# Patient Record
Sex: FEMALE | Race: BLACK OR AFRICAN AMERICAN | Employment: FULL TIME | URBAN - METROPOLITAN AREA
[De-identification: names, ages, dates, MRNs, and addresses within clinical notes are randomized per-mention and may not be internally consistent; named-entity substitution may affect disease eponyms.]

---

## 2018-10-16 ENCOUNTER — OFFICE VISIT (OUTPATIENT)
Dept: OBGYN CLINIC | Facility: CLINIC | Age: 53
End: 2018-10-16
Payer: COMMERCIAL

## 2018-10-16 VITALS
WEIGHT: 170 LBS | SYSTOLIC BLOOD PRESSURE: 126 MMHG | HEIGHT: 65.5 IN | BODY MASS INDEX: 27.98 KG/M2 | DIASTOLIC BLOOD PRESSURE: 90 MMHG

## 2018-10-16 DIAGNOSIS — N95.0 POSTMENOPAUSAL BLEEDING: Primary | ICD-10-CM

## 2018-10-16 PROCEDURE — 99213 OFFICE O/P EST LOW 20 MIN: CPT | Performed by: OBSTETRICS & GYNECOLOGY

## 2018-10-16 RX ORDER — LEVOTHYROXINE SODIUM 0.1 MG/1
TABLET ORAL
Refills: 2 | COMMUNITY
Start: 2018-10-12

## 2018-10-16 RX ORDER — ERGOCALCIFEROL 1.25 MG/1
CAPSULE ORAL
Refills: 0 | COMMUNITY
Start: 2018-10-12

## 2018-10-16 NOTE — PROGRESS NOTES
GYN H&P     10/16/2018  4:27 PM    CC: Patient is here for postmenopausal bleeding. Pt of Dr. Christiano Cintron. Previous pt of mine, not seen for 2 years.     HPI: Patient is a 47year old  for vaginal spotting which comes and goes, occurring every 4 - 5 M mariam History      Occupation:     Tobacco Use      Smoking status: Never Smoker      Smokeless tobacco: Never Used    Substance and Sexual Activity      Alcohol use: No        Frequency: Never      Drug use: No      Sexual activity: Yes are with a normal range (double layer thickness = 9.4 mm)    A/P: Patient is 47year old female     1. Postmenopausal bleeding    Likely perimenopausal bleeding. Plan to check USN to evaluate EM stripe.   Elba Gifford MD

## 2018-10-25 ENCOUNTER — ULTRASOUND ENCOUNTER (OUTPATIENT)
Dept: OBGYN CLINIC | Facility: CLINIC | Age: 53
End: 2018-10-25
Payer: COMMERCIAL

## 2018-10-25 DIAGNOSIS — N95.0 POSTMENOPAUSAL BLEEDING: ICD-10-CM

## 2018-10-25 PROCEDURE — 76830 TRANSVAGINAL US NON-OB: CPT | Performed by: OBSTETRICS & GYNECOLOGY

## 2018-10-30 ENCOUNTER — TELEPHONE (OUTPATIENT)
Dept: OBGYN CLINIC | Facility: CLINIC | Age: 53
End: 2018-10-30

## 2018-10-30 NOTE — TELEPHONE ENCOUNTER
----- Message from Erika Kenny RN sent at 10/30/2018 11:19 AM CDT -----  Dr. Sloane Boswell,   I gave pt results and offered to schedule her for an appt if she other concerns and she would like to speak to you.     Laim Granda    ----- Message -----  From:

## 2018-10-30 NOTE — TELEPHONE ENCOUNTER
Called pt back and dw pt normal usn. She is still having a small amt of bleeding after sex. Recommend KY jelly.  DW her pap and USN are both normal.

## 2019-11-27 ENCOUNTER — OFFICE VISIT (OUTPATIENT)
Dept: FAMILY MEDICINE CLINIC | Facility: CLINIC | Age: 54
End: 2019-11-27
Payer: COMMERCIAL

## 2019-11-27 VITALS
DIASTOLIC BLOOD PRESSURE: 76 MMHG | HEART RATE: 44 BPM | SYSTOLIC BLOOD PRESSURE: 144 MMHG | BODY MASS INDEX: 26.72 KG/M2 | WEIGHT: 160.38 LBS | TEMPERATURE: 97 F | RESPIRATION RATE: 16 BRPM | HEIGHT: 65 IN | OXYGEN SATURATION: 97 %

## 2019-11-27 DIAGNOSIS — M79.661 PAIN OF RIGHT CALF: Primary | ICD-10-CM

## 2019-11-27 PROBLEM — M72.2 PLANTAR FASCIITIS, BILATERAL: Status: ACTIVE | Noted: 2019-11-27

## 2019-11-27 PROCEDURE — 99203 OFFICE O/P NEW LOW 30 MIN: CPT | Performed by: NURSE PRACTITIONER

## 2019-11-27 RX ORDER — NAPROXEN 500 MG/1
500 TABLET ORAL 2 TIMES DAILY WITH MEALS
Qty: 30 TABLET | Refills: 0 | Status: SHIPPED | OUTPATIENT
Start: 2019-11-27 | End: 2019-12-12

## 2019-11-27 NOTE — PROGRESS NOTES
HPI:    Patient ID: Sumit Kevin Adjutant is a 49year old female. Patient presents to walk in clinic with right calf pain.  She was exercising yesterday in an intense step class and stepped backward off the step; she immediately felt a sharp pain and pressu Comments: Upon exam patient with mild limp due to right calf pain. Distal pulses equal bilaterally with capillary refill to toes less than 3 seconds. Equal popliteal pulses bilaterally.  Mild swelling to right calf of 0.25 inches (may be normal deviation · Keep the hurt area raised above heart level to reduce pain and swelling. This is especially important during the first 48 hours. · Apply an ice pack over the injured area for 15 to 20 minutes every 3 to 6 hours.  You should do this for the first 24 to 48

## 2019-11-27 NOTE — PATIENT INSTRUCTIONS
· Please start Naproxyn twice daily with meals to help with swelling/pain. Take prevacid or Prilosec once daily during use to protect stomach. · Use ice 20 minutes on/off with covered ice bag to area to help with pain.   · Keep leg elevated as often as pos injured leg without pain. Follow-up care  Follow up with your healthcare provider, or as advised.   When to seek medical advice  Call your healthcare provider right away if any of these occur:  · The toes of the injured leg become swollen, cold, blue, numb

## 2021-07-13 ENCOUNTER — WALK IN (OUTPATIENT)
Dept: URGENT CARE | Age: 56
End: 2021-07-13

## 2021-07-13 VITALS
HEART RATE: 72 BPM | SYSTOLIC BLOOD PRESSURE: 128 MMHG | DIASTOLIC BLOOD PRESSURE: 80 MMHG | RESPIRATION RATE: 16 BRPM | TEMPERATURE: 98.2 F
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
## 2021-07-13 DIAGNOSIS — H10.9 CONJUNCTIVITIS OF LEFT EYE, UNSPECIFIED CONJUNCTIVITIS TYPE: ICD-10-CM

## 2021-07-13 PROCEDURE — 99213 OFFICE O/P EST LOW 20 MIN: CPT | Performed by: NURSE PRACTITIONER

## 2021-07-13 RX ORDER — METHYLPREDNISOLONE 4 MG/1
4 TABLET ORAL SEE ADMIN INSTRUCTIONS
Qty: 21 TABLET | Refills: 0 | Status: SHIPPED | OUTPATIENT
Start: 2021-07-13

## 2021-07-13 RX ORDER — LEVOTHYROXINE SODIUM 0.1 MG/1
TABLET ORAL
COMMUNITY
Start: 2018-10-12

## 2021-07-13 RX ORDER — ERYTHROMYCIN 5 MG/G
OINTMENT OPHTHALMIC
Qty: 3.5 G | Refills: 0 | Status: SHIPPED | OUTPATIENT
Start: 2021-07-13

## 2021-07-13 ASSESSMENT — VISUAL ACUITY: OU: 1

## 2021-07-13 ASSESSMENT — ENCOUNTER SYMPTOMS
EYE ITCHING: 1
EYE REDNESS: 1
FEVER: 0
CHILLS: 0
PHOTOPHOBIA: 1
FATIGUE: 0

## 2022-01-11 ENCOUNTER — LAB ENCOUNTER (OUTPATIENT)
Dept: LAB | Age: 57
End: 2022-01-11
Attending: NURSE PRACTITIONER
Payer: COMMERCIAL

## 2022-01-11 ENCOUNTER — ORDER TRANSCRIPTION (OUTPATIENT)
Dept: ADMINISTRATIVE | Facility: HOSPITAL | Age: 57
End: 2022-01-11

## 2022-01-11 DIAGNOSIS — U07.1 INFECTION DUE TO 2019-NCOV: ICD-10-CM

## 2022-01-11 DIAGNOSIS — U07.1 INFECTION DUE TO 2019-NCOV: Primary | ICD-10-CM

## 2022-01-13 LAB — SARS-COV-2 RNA RESP QL NAA+PROBE: DETECTED

## 2023-01-02 ENCOUNTER — HOSPITAL ENCOUNTER (EMERGENCY)
Facility: HOSPITAL | Age: 58
Discharge: HOME | End: 2023-01-02
Payer: COMMERCIAL

## 2023-01-02 ENCOUNTER — APPOINTMENT (OUTPATIENT)
Dept: GENERAL RADIOLOGY | Facility: HOSPITAL | Age: 58
End: 2023-01-02
Attending: NURSE PRACTITIONER
Payer: COMMERCIAL

## 2023-01-02 VITALS
HEIGHT: 66 IN | TEMPERATURE: 98 F | OXYGEN SATURATION: 98 % | RESPIRATION RATE: 18 BRPM | HEART RATE: 45 BPM | WEIGHT: 160 LBS | SYSTOLIC BLOOD PRESSURE: 138 MMHG | BODY MASS INDEX: 25.71 KG/M2 | DIASTOLIC BLOOD PRESSURE: 87 MMHG

## 2023-01-02 DIAGNOSIS — S60.222A CONTUSION OF LEFT HAND, INITIAL ENCOUNTER: ICD-10-CM

## 2023-01-02 DIAGNOSIS — S76.011A STRAIN OF RIGHT HIP, INITIAL ENCOUNTER: Primary | ICD-10-CM

## 2023-01-02 PROCEDURE — 73502 X-RAY EXAM HIP UNI 2-3 VIEWS: CPT | Performed by: NURSE PRACTITIONER

## 2023-01-02 PROCEDURE — 99284 EMERGENCY DEPT VISIT MOD MDM: CPT

## 2023-01-02 PROCEDURE — 73130 X-RAY EXAM OF HAND: CPT | Performed by: NURSE PRACTITIONER

## 2023-01-02 RX ORDER — TRAMADOL HYDROCHLORIDE 50 MG/1
50 TABLET ORAL ONCE
Status: COMPLETED | OUTPATIENT
Start: 2023-01-02 | End: 2023-01-02

## 2023-01-02 RX ORDER — TRAMADOL HYDROCHLORIDE 50 MG/1
TABLET ORAL EVERY 6 HOURS PRN
Qty: 10 TABLET | Refills: 0 | Status: SHIPPED | OUTPATIENT
Start: 2023-01-02 | End: 2023-01-07
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
## 2023-12-26 ENCOUNTER — HOSPITAL ENCOUNTER (OUTPATIENT)
Age: 58
Discharge: HOME | End: 2023-12-26
Payer: COMMERCIAL

## 2023-12-26 VITALS
HEART RATE: 62 BPM | DIASTOLIC BLOOD PRESSURE: 80 MMHG | TEMPERATURE: 98 F | SYSTOLIC BLOOD PRESSURE: 131 MMHG | OXYGEN SATURATION: 99 % | RESPIRATION RATE: 18 BRPM

## 2023-12-26 DIAGNOSIS — J04.0 ACUTE LARYNGITIS: ICD-10-CM

## 2023-12-26 DIAGNOSIS — J06.9 VIRAL URI: Primary | ICD-10-CM

## 2023-12-26 LAB — SARS-COV-2 RNA RESP QL NAA+PROBE: NOT DETECTED

## 2023-12-26 PROCEDURE — 99213 OFFICE O/P EST LOW 20 MIN: CPT

## 2023-12-26 PROCEDURE — 99212 OFFICE O/P EST SF 10 MIN: CPT

## 2023-12-26 NOTE — ED INITIAL ASSESSMENT (HPI)
Patient with runny nose, sore throat, chills, runny eyes and body aches for the past week.   Reports improved symptoms with taking zinc and vitamin c.

## (undated) NOTE — LETTER
Date & Time: 12/26/2023, 2:03 PM  Patient: Sumit Orozco  Encounter Provider(s):    Alis Mary       To Whom It May Concern:    Sumit Orozco was seen and treated in our department on 12/26/2023. She can return to work 12/29/2023.     If you have any questions or concerns, please do not hesitate to call.        _____________________________  Physician/[facility] Signature